# Patient Record
Sex: OTHER/UNKNOWN | Race: WHITE | NOT HISPANIC OR LATINO | Employment: FULL TIME | URBAN - METROPOLITAN AREA
[De-identification: names, ages, dates, MRNs, and addresses within clinical notes are randomized per-mention and may not be internally consistent; named-entity substitution may affect disease eponyms.]

---

## 2023-01-18 ENCOUNTER — TELEPHONE (OUTPATIENT)
Dept: BEHAVIORAL HEALTH | Facility: CLINIC | Age: 18
End: 2023-01-18

## 2023-01-18 ENCOUNTER — HOSPITAL ENCOUNTER (EMERGENCY)
Facility: CLINIC | Age: 18
Discharge: HOME | End: 2023-01-19
Attending: EMERGENCY MEDICINE | Admitting: EMERGENCY MEDICINE
Payer: COMMERCIAL

## 2023-01-18 DIAGNOSIS — F32.A DEPRESSION, UNSPECIFIED DEPRESSION TYPE: ICD-10-CM

## 2023-01-18 DIAGNOSIS — F41.9 ANXIETY: ICD-10-CM

## 2023-01-18 DIAGNOSIS — Z72.89 SELF-INJURIOUS BEHAVIOR: ICD-10-CM

## 2023-01-18 DIAGNOSIS — R45.851 SUICIDAL IDEATION: ICD-10-CM

## 2023-01-18 LAB — SARS-COV-2 RNA RESP QL NAA+PROBE: NEGATIVE

## 2023-01-18 PROCEDURE — 99285 EMERGENCY DEPT VISIT HI MDM: CPT | Mod: 25 | Performed by: EMERGENCY MEDICINE

## 2023-01-18 PROCEDURE — U0003 INFECTIOUS AGENT DETECTION BY NUCLEIC ACID (DNA OR RNA); SEVERE ACUTE RESPIRATORY SYNDROME CORONAVIRUS 2 (SARS-COV-2) (CORONAVIRUS DISEASE [COVID-19]), AMPLIFIED PROBE TECHNIQUE, MAKING USE OF HIGH THROUGHPUT TECHNOLOGIES AS DESCRIBED BY CMS-2020-01-R: HCPCS | Performed by: EMERGENCY MEDICINE

## 2023-01-18 PROCEDURE — 99285 EMERGENCY DEPT VISIT HI MDM: CPT | Performed by: EMERGENCY MEDICINE

## 2023-01-18 PROCEDURE — 250N000013 HC RX MED GY IP 250 OP 250 PS 637: Performed by: EMERGENCY MEDICINE

## 2023-01-18 PROCEDURE — 90791 PSYCH DIAGNOSTIC EVALUATION: CPT

## 2023-01-18 PROCEDURE — C9803 HOPD COVID-19 SPEC COLLECT: HCPCS | Performed by: EMERGENCY MEDICINE

## 2023-01-18 RX ORDER — TRAZODONE HYDROCHLORIDE 50 MG/1
50 TABLET, FILM COATED ORAL
Status: DISCONTINUED | OUTPATIENT
Start: 2023-01-18 | End: 2023-01-19 | Stop reason: HOSPADM

## 2023-01-18 RX ORDER — HYDROXYZINE HYDROCHLORIDE 25 MG/1
25 TABLET, FILM COATED ORAL EVERY 6 HOURS PRN
Status: DISCONTINUED | OUTPATIENT
Start: 2023-01-18 | End: 2023-01-19 | Stop reason: HOSPADM

## 2023-01-18 RX ADMIN — TRAZODONE HYDROCHLORIDE 50 MG: 50 TABLET ORAL at 23:40

## 2023-01-18 RX ADMIN — HYDROXYZINE HYDROCHLORIDE 25 MG: 25 TABLET, FILM COATED ORAL at 23:40

## 2023-01-18 ASSESSMENT — COLUMBIA-SUICIDE SEVERITY RATING SCALE - C-SSRS
ATTEMPT PAST THREE MONTHS: YES
1. HAVE YOU WISHED YOU WERE DEAD OR WISHED YOU COULD GO TO SLEEP AND NOT WAKE UP?: YES
5. HAVE YOU STARTED TO WORK OUT OR WORKED OUT THE DETAILS OF HOW TO KILL YOURSELF? DO YOU INTEND TO CARRY OUT THIS PLAN?: YES
TOTAL  NUMBER OF INTERRUPTED ATTEMPTS LIFETIME: NO
MOST RECENT DATE: 66492
LETHALITY/MEDICAL DAMAGE CODE FIRST POTENTIAL ATTEMPT: BEHAVIOR LIKELY TO RESULT IN DEATH DESPITE AVAILABLE MEDICAL CARE
4. HAVE YOU HAD THESE THOUGHTS AND HAD SOME INTENTION OF ACTING ON THEM?: YES
5. HAVE YOU STARTED TO WORK OUT OR WORKED OUT THE DETAILS OF HOW TO KILL YOURSELF? DO YOU INTEND TO CARRY OUT THIS PLAN?: YES
TOTAL  NUMBER OF ACTUAL ATTEMPTS PAST 3 MONTHS: 1
LETHALITY/MEDICAL DAMAGE CODE MOST RECENT ACTUAL ATTEMPT: NO PHYSICAL DAMAGE OR VERY MINOR PHYSICAL DAMAGE
4. HAVE YOU HAD THESE THOUGHTS AND HAD SOME INTENTION OF ACTING ON THEM?: YES
LETHALITY/MEDICAL DAMAGE CODE MOST LETHAL ACTUAL ATTEMPT: NO PHYSICAL DAMAGE OR VERY MINOR PHYSICAL DAMAGE
LETHALITY/MEDICAL DAMAGE CODE MOST RECENT POTENTIAL ATTEMPT: BEHAVIOR LIKELY TO RESULT IN INJURY BUT NOT LIKELY TO CAUSE DEATH
TOTAL  NUMBER OF ABORTED OR SELF INTERRUPTED ATTEMPTS PAST 3 MONTHS: YES
MOST LETHAL DATE: 66492
TOTAL  NUMBER OF ABORTED OR SELF INTERRUPTED ATTEMPTS LIFETIME: 1
LETHALITY/MEDICAL DAMAGE CODE MOST LETHAL POTENTIAL ATTEMPT: BEHAVIOR LIKELY TO RESULT IN INJURY BUT NOT LIKELY TO CAUSE DEATH
3. HAVE YOU BEEN THINKING ABOUT HOW YOU MIGHT KILL YOURSELF?: YES
REASONS FOR IDEATION LIFETIME: COMPLETELY TO END OR STOP THE PAIN (YOU COULDN'T GO ON LIVING WITH THE PAIN OR HOW YOU WERE FEELING)
TOTAL  NUMBER OF ABORTED OR SELF INTERRUPTED ATTEMPTS LIFETIME: YES
TOTAL  NUMBER OF ACTUAL ATTEMPTS LIFETIME: 2
LETHALITY/MEDICAL DAMAGE CODE FIRST ACTUAL ATTEMPT: MINOR PHYSICAL DAMAGE
REASONS FOR IDEATION PAST MONTH: COMPLETELY TO END OR STOP THE PAIN (YOU COULDN'T GO ON LIVING WITH THE PAIN OR HOW YOU WERE FEELING)
2. HAVE YOU ACTUALLY HAD ANY THOUGHTS OF KILLING YOURSELF?: YES
ATTEMPT LIFETIME: YES
2. HAVE YOU ACTUALLY HAD ANY THOUGHTS OF KILLING YOURSELF?: YES
TOTAL  NUMBER OF ABORTED OR SELF INTERRUPTED ATTEMPTS SINCE LAST CONTACT: 1
6. HAVE YOU EVER DONE ANYTHING, STARTED TO DO ANYTHING, OR PREPARED TO DO ANYTHING TO END YOUR LIFE?: NO
1. IN THE PAST MONTH, HAVE YOU WISHED YOU WERE DEAD OR WISHED YOU COULD GO TO SLEEP AND NOT WAKE UP?: YES

## 2023-01-18 ASSESSMENT — ACTIVITIES OF DAILY LIVING (ADL): ADLS_ACUITY_SCORE: 35

## 2023-01-19 ENCOUNTER — TELEPHONE (OUTPATIENT)
Dept: BEHAVIORAL HEALTH | Facility: CLINIC | Age: 18
End: 2023-01-19
Payer: COMMERCIAL

## 2023-01-19 VITALS
DIASTOLIC BLOOD PRESSURE: 63 MMHG | HEART RATE: 57 BPM | WEIGHT: 126.8 LBS | TEMPERATURE: 98.1 F | SYSTOLIC BLOOD PRESSURE: 98 MMHG | OXYGEN SATURATION: 99 % | RESPIRATION RATE: 16 BRPM

## 2023-01-19 LAB
ALBUMIN SERPL-MCNC: 3.5 G/DL (ref 3.4–5)
ALP SERPL-CCNC: 63 U/L (ref 40–260)
ALT SERPL W P-5'-P-CCNC: 15 U/L (ref 0–50)
ANION GAP SERPL CALCULATED.3IONS-SCNC: 5 MMOL/L (ref 3–14)
APAP SERPL-MCNC: <2 MG/L (ref 10–30)
AST SERPL W P-5'-P-CCNC: 11 U/L (ref 0–35)
BASOPHILS # BLD AUTO: 0.1 10E3/UL (ref 0–0.2)
BASOPHILS NFR BLD AUTO: 1 %
BILIRUB SERPL-MCNC: 0.3 MG/DL (ref 0.2–1.3)
BUN SERPL-MCNC: 6 MG/DL (ref 7–30)
CALCIUM SERPL-MCNC: 8.5 MG/DL (ref 8.5–10.1)
CHLORIDE BLD-SCNC: 113 MMOL/L (ref 96–110)
CO2 SERPL-SCNC: 24 MMOL/L (ref 20–32)
CREAT SERPL-MCNC: 0.56 MG/DL (ref 0.5–1)
EOSINOPHIL # BLD AUTO: 0.2 10E3/UL (ref 0–0.7)
EOSINOPHIL NFR BLD AUTO: 3 %
ERYTHROCYTE [DISTWIDTH] IN BLOOD BY AUTOMATED COUNT: 12.8 % (ref 10–15)
GFR SERPL CREATININE-BSD FRML MDRD: ABNORMAL ML/MIN/{1.73_M2}
GLUCOSE BLD-MCNC: 90 MG/DL (ref 70–99)
HCT VFR BLD AUTO: 36.8 % (ref 35–53)
HGB BLD-MCNC: 12.6 G/DL (ref 11.7–17.7)
IMM GRANULOCYTES # BLD: 0 10E3/UL
IMM GRANULOCYTES NFR BLD: 0 %
LYMPHOCYTES # BLD AUTO: 2.7 10E3/UL (ref 0.8–5.3)
LYMPHOCYTES NFR BLD AUTO: 41 %
MCH RBC QN AUTO: 30.2 PG (ref 26.5–33)
MCHC RBC AUTO-ENTMCNC: 34.2 G/DL (ref 31.5–36.5)
MCV RBC AUTO: 88 FL (ref 78–100)
MONOCYTES # BLD AUTO: 0.5 10E3/UL (ref 0–1.3)
MONOCYTES NFR BLD AUTO: 8 %
NEUTROPHILS # BLD AUTO: 3.1 10E3/UL (ref 1.6–8.3)
NEUTROPHILS NFR BLD AUTO: 47 %
NRBC # BLD AUTO: 0 10E3/UL
NRBC BLD AUTO-RTO: 0 /100
PLATELET # BLD AUTO: 311 10E3/UL (ref 150–450)
POTASSIUM BLD-SCNC: 3.5 MMOL/L (ref 3.4–5.3)
PROT SERPL-MCNC: 6.6 G/DL (ref 6.8–8.8)
RBC # BLD AUTO: 4.17 10E6/UL (ref 3.8–5.9)
SALICYLATES SERPL-MCNC: <2 MG/DL
SODIUM SERPL-SCNC: 142 MMOL/L (ref 133–144)
WBC # BLD AUTO: 6.5 10E3/UL (ref 4–11)

## 2023-01-19 PROCEDURE — 80053 COMPREHEN METABOLIC PANEL: CPT | Performed by: EMERGENCY MEDICINE

## 2023-01-19 PROCEDURE — 99284 EMERGENCY DEPT VISIT MOD MDM: CPT | Mod: 25

## 2023-01-19 PROCEDURE — 80179 DRUG ASSAY SALICYLATE: CPT | Performed by: EMERGENCY MEDICINE

## 2023-01-19 PROCEDURE — 250N000013 HC RX MED GY IP 250 OP 250 PS 637

## 2023-01-19 PROCEDURE — 36415 COLL VENOUS BLD VENIPUNCTURE: CPT | Performed by: EMERGENCY MEDICINE

## 2023-01-19 PROCEDURE — 85025 COMPLETE CBC W/AUTO DIFF WBC: CPT | Performed by: EMERGENCY MEDICINE

## 2023-01-19 PROCEDURE — 250N000013 HC RX MED GY IP 250 OP 250 PS 637: Performed by: EMERGENCY MEDICINE

## 2023-01-19 PROCEDURE — 80143 DRUG ASSAY ACETAMINOPHEN: CPT | Performed by: EMERGENCY MEDICINE

## 2023-01-19 RX ORDER — OLANZAPINE 5 MG/1
5 TABLET, ORALLY DISINTEGRATING ORAL AT BEDTIME
Status: DISCONTINUED | OUTPATIENT
Start: 2023-01-19 | End: 2023-01-19 | Stop reason: HOSPADM

## 2023-01-19 RX ORDER — FLUOXETINE 10 MG/1
10 CAPSULE ORAL DAILY
Status: DISCONTINUED | OUTPATIENT
Start: 2023-01-19 | End: 2023-01-19 | Stop reason: HOSPADM

## 2023-01-19 RX ADMIN — HYDROXYZINE HYDROCHLORIDE 25 MG: 25 TABLET, FILM COATED ORAL at 15:00

## 2023-01-19 RX ADMIN — FLUOXETINE 10 MG: 10 CAPSULE ORAL at 16:19

## 2023-01-19 ASSESSMENT — ACTIVITIES OF DAILY LIVING (ADL)
ADLS_ACUITY_SCORE: 35

## 2023-01-19 ASSESSMENT — COLUMBIA-SUICIDE SEVERITY RATING SCALE - C-SSRS
TOTAL  NUMBER OF ABORTED OR SELF INTERRUPTED ATTEMPTS SINCE LAST CONTACT: NO
ATTEMPT SINCE LAST CONTACT: NO
LETHALITY/MEDICAL DAMAGE CODE MOST LETHAL ACTUAL ATTEMPT: NO PHYSICAL DAMAGE OR VERY MINOR PHYSICAL DAMAGE
SUICIDE, SINCE LAST CONTACT: NO
1. SINCE LAST CONTACT, HAVE YOU WISHED YOU WERE DEAD OR WISHED YOU COULD GO TO SLEEP AND NOT WAKE UP?: NO
TOTAL  NUMBER OF INTERRUPTED ATTEMPTS SINCE LAST CONTACT: NO
MOST LETHAL DATE: 66492
6. HAVE YOU EVER DONE ANYTHING, STARTED TO DO ANYTHING, OR PREPARED TO DO ANYTHING TO END YOUR LIFE?: NO
2. HAVE YOU ACTUALLY HAD ANY THOUGHTS OF KILLING YOURSELF?: NO
LETHALITY/MEDICAL DAMAGE CODE MOST LETHAL POTENTIAL ATTEMPT: BEHAVIOR LIKELY TO RESULT IN INJURY BUT NOT LIKELY TO CAUSE DEATH

## 2023-01-19 NOTE — ED PROVIDER NOTES
"ED Provider Note  [facility]      History     Chief Complaint   Patient presents with     Suicidal     Pt was planning on ODing on a bottle of tylenol today but \"I got scared and called my mom\".      Self Injury     cut L FA with a razor blade \"2 or 3 days ago\"       HPI  Kelli CAICEDO Brown \"Roman\" is a 17 year old adult (they/them pronouns)who presents with mother for mental health evaluation.  They say they have hx of depression, anxiety, BPD. The patient is having suicidal thoughts with plan and intent.  Earlier today they took 4 tylenol pills.  They were intending to take the whole bottle but stopped because they got \"freaked out\" and called their mom who brought them here.  They are now thinking about turning on the car while in the garage to kill themself.  They have one prior suicide attempt in 2019 where they used a weight while they were in the bathtub.  They feel that parents have tried to deal with their mental health without seeing providers and doing \"meditation.\"  No prior admissions.  They saw a therapist occasionally or school counselor in the past.  They cut on both arms and last cut on left forearm 3 days ago.  They feel like they can be safe while waiting in the ED but not if they go home. They say the holidays were hard this past year and they have been feeling suicidal since New Years.  They also had a recent best friend break up. They took risperidone and prozac in the past and quit about 1 year ago. They felt it didn't help them.  Denies cd concerns.       Past Medical History  History reviewed. No pertinent past medical history.  History reviewed. No pertinent surgical history.  No current outpatient medications on file.    No Known Allergies  Family History  History reviewed. No pertinent family history.  Social History   Social History     Tobacco Use     Smoking status: Every Day     Types: Vaping Device     Smokeless tobacco: Never   Substance Use Topics     " Alcohol use: Not Currently     Drug use: Not Currently      Past medical history, past surgical history, medications, allergies, family history, and social history were reviewed with the patient. No additional pertinent items.      A complete review of systems was performed with pertinent positives and negatives noted in the HPI, and all other systems negative.    Physical Exam   BP: 104/71  Pulse: 94  Temp: 98.9  F (37.2  C)  Resp: 16  Weight: 57.5 kg (126 lb 12.8 oz)  SpO2: 97 %  Physical Exam  Vitals and nursing note reviewed.   Constitutional:       Appearance: Roman Mcnally is normal weight.   HENT:      Head: Normocephalic and atraumatic.      Nose: No congestion or rhinorrhea.   Eyes:      Extraocular Movements: Extraocular movements intact.   Cardiovascular:      Rate and Rhythm: Normal rate.   Pulmonary:      Effort: Pulmonary effort is normal.   Musculoskeletal:         General: Signs of injury present. No deformity.      Cervical back: Normal range of motion.   Skin:     Coloration: Skin is not jaundiced or pale.      Findings: No bruising, erythema, lesion or rash.   Neurological:      General: No focal deficit present.      Mental Status: Roman Mcnally is alert and oriented to person, place, and time.   Psychiatric:         Attention and Perception: Attention and perception normal.         Mood and Affect: Mood is anxious and depressed. Affect is flat.         Speech: Speech normal.         Behavior: Behavior is withdrawn.         Thought Content: Thought content includes suicidal ideation. Thought content includes suicidal plan.         Cognition and Memory: Cognition and memory normal.         Judgment: Judgment normal.           ED Course, Procedures, & Data      Procedures         Mental Health Risk Assessment      PSS-3    Date and Time Over the past 2 weeks have you felt down, depressed, or hopeless? Over the past 2 weeks have you had thoughts of killing yourself? Have you ever attempted to kill  "yourself? When did this last happen? User   01/18/23 2147 yes yes no -- [facility]      C-SSRS (Lucinda)    Date and Time Q1 Wished to be Dead (Past Month) Q2 Suicidal Thoughts (Past Month) Q3 Suicidal Thought Method Q4 Suicidal Intent without Specific Plan Q5 Suicide Intent with Specific Plan Q6 Suicide Behavior (Lifetime) Within the Past 3 Months? RETIRED: Level of Risk per Screen Screening Not Complete User   01/18/23 2147 yes yes yes yes yes no -- -- -- [facility]              Suicide assessment completed by mental health (D.E.C., LCSW, etc.)       No results found for any visits on 01/18/23.  Medications - No data to display  Labs Ordered and Resulted from Time of ED Arrival to Time of ED Departure - No data to display  No orders to display          Medical Decision Making  The patient presented with a problem that is a chronic illness mild to moderate exacerbation, progression, or side effect of treatment.    The patient's evaluation involved:  ordering and review of 3+ test(s) (see separate area of note for details)  discussion of management or test interpretation with another health professional (see separate area of note for details)    The patient's management involved a decision regarding hospitalization.      Assessment & Plan    Kelli Mcnally \"Roman\" is a 17 year old adult (they/them pronouns)who presents with mother for mental health evaluation.  They say they have hx of depression, anxiety, BPD. The patient is having suicidal thoughts with plan and intent.  They were planning to take an entire bottle of tylenol earlier today but took 4 and then stopped because they \"freaked out.\"  They are now thinking about killing them with CO in car/garage.  They were seen by myself and the DEC  and we discussed the case. Admission to inpatient mental health is recommended.  Patient agrees with this plan . This is a voluntary admit.  Labs were ordered based on hx of ingestion earlier today. Dr. Frias will check labs to " make sure no further intervention is needed.      I have reviewed the nursing notes. I have reviewed the findings, diagnosis, plan and need for follow up with the patient.    New Prescriptions    No medications on file       Final diagnoses:   Depression, unspecified depression type   Anxiety   Suicidal ideation   Self-injurious behavior       Catie Spangler MD  [facility] EMERGENCY DEPARTMENT  1/18/2023     Catie Spangler MD  01/23/23 1840     Alert and oriented to person, place and time

## 2023-01-19 NOTE — DISCHARGE INSTRUCTIONS
Aftercare Plan    Follow up with established providers and supports as scheduled. Continue taking medications as prescribed. Abstain from drugs and alcohol. Utilize your [facility] crisis team as needed. They are available 24/7. Contact information is listed below.     The following appointment(s) and/or referral(s) were made on your behalf. If you need to make changes or cancel please contact the clinic/provider directly.     Individual Therapy    Date: Wednesday, 1/25/2023  Time: 11:00 am - 12:00 pm  Provider: Shellie Mullen  Location: [facility], [address] [place] [address]  Phone: [phone]  Type: Therapy - Initial (In-Person)    Patient Instructions  Intakes must be confirmed with client either by email or phone call prior to the appointment. Clients will be sent an email to log into the patient portal to complete intake forms. All intake forms must be completed before the scheduled intake. If the session is scheduled for telehealth, client's will receive an email with a zoom link. Shellie can be reached at [phone] or [email or web address] Address is [address] [place] [address]. ([facility] on [facility])    Psychiatry    Date: Thursday, 2/9/2023  Time: 8:30 am - 10:00 am  Provider: Liam PLASCENCIA  CNP,RN  Location: [facility], [address] [place] [address], [facility] [address] [place] [address]  Phone: [phone]  Type: Medication Mgmt - Initial (In-Person)    A referral will also be made for you to [facility]'s Transitions [facility]. The Transitions Clinic can see you for medication management until your psychiatry appointment on 02/09/23. You will receive a call from the Transitions Clinic in the next couple of days to schedule a medication management appointment.     Remember: give the referrals 3 sessions prior to calling it quits. Do you trust them? Do you feel understood? Do you think they can help? Check in with yourself after each  session    If I am feeling unsafe or I am in a crisis, I will:   Contact my established care providers   Call the National Suicide Prevention Lifeline: 315.832.9418   Go to the nearest emergency room   Call 911     Warning signs that I or other people might notice when a crisis is developing for me: changes to sleep, appetite or mood, increased anger, agitation or irritability, feeling depressed or hopeless, spending more time alone or talking less, increased crying, decreased productivity, seeing or hearing things that aren't there, thoughts of not wanting to live anymore or of actually killing myself, thoughts of hurting others    Things I am able to do on my own to cope or help me feel better: watching a favorite tv show or movie, listening to music I enjoy, going outside and breathing fresh air, going for a walk or exercising, taking a shower or bath, a cold or hot beverage, a healthy snack, drawing/coloring/painting, journaling, singing or dancing, deep breathing     I can try practicing square breathing when I begin to feel anxious - inhale through the nose for the count of 4 and the first line on the square. Exhale through the mouth for the count of 4 for the second line of the square. Repeat to complete the square. Repeat the square as many times as needed.    I can also use my five senses to practice mindfulness and grounding. What are five things I can see, four things I can hear, three things I can feel, two things I can smell, and one thing I can taste.     Things that I am able to do with others to cope or help me feel better: sometimes just talking or spending time with someone else, sharing a meal or having coffee, watching a movie or playing a game, going for a walk or exercising    I can also use community resources including mental health hotlines, [facility] crisis teams, or apps.     Things I can use or do for distraction: movies/tv, music, reading, games, drawing/coloring/painting or other art,  "essential oils, exercise, cleaning/organizing, puzzles, crossword puzzles, word search, Sudoku       I can also download a meditation or relaxation riana, like Calm, Headspace, or Insight Timer (all three offer a free version)    Changes I can make to support my mental health and wellness: Attend scheduled mental health therapy and psychiatric appointments. Take my medications as prescribed. Maintain a daily schedule/routine. Abstain from all mood altering substances, including drugs, alcohol, or medications not currently prescribed to me. Implement a self-care routine.      People in my life that I can ask for help: friends or family, trusted teachers/staff/colleagues, trusted members of my community or place of [place], mental health crisis lines, or 911    Your [facility] has a mental health crisis team you can call 24/7: [facility] Adult, [phone]    Other things that are important when I m in crisis: to remember that the feelings I am having right now are temporary, and it won't feel like this forever, and that it is okay and important to ask for help    Crisis Lines  Crisis Text Line  Text 512359  You will be connected with a trained live crisis counselor to provide support.    Por espanol, texto  KIANA a 635320 o texto a 442-AYUDAME en WhatsApp    National Hope Line  1.800.SUICIDE [9550113]      Community Resources  Fast Tracker  Linking people to mental health and substance use disorder resources  fasttrackermn.org     [place] Mental Health Warm Line  Peer to peer support  Monday thru Saturday, 12 pm to 10 pm  363.346.1538 or 1.509.168.7253  Text \"Support\" to 12177    National [place] on Mental Illness ([place])  398.121.6376 or 1.888.EL.HELPS      Mental Health Apps  My3  https://my3app.org/    VirtualHopeBox  https://[employer].org/apps/virtual-hope-box/      Additional Information  Today you were seen by a licensed mental health professional through Triage and Transition services, " Behavioral Healthcare Providers ([facility])  for a crisis assessment in the Emergency Department at [facility].  It is recommended that you follow up with your established providers (psychiatrist, mental health therapist, and/or primary care doctor - as relevant) as soon as possible. Coordinators from [facility] will be calling you in the next 24-48 hours to ensure that you have the resources you need.  You can also contact [facility] coordinators directly at [phone]. You may have been scheduled for or offered an appointment with a mental health provider. [facility] maintains an extensive network of licensed behavioral health providers to connect patients with the services they need.  We do not charge providers a fee to participate in our referral network.  We match patients with providers based on a patient's specific needs, insurance coverage, and location.  Our first effort will be to refer you to a provider within your care system, and will utilize providers outside your care system as needed.

## 2023-01-19 NOTE — ED NOTES
Patient agreeable to discharge plan. Discharge instructions reviewed with patient including follow-up care plan. Medications: were unchanged during this visit. Reviewed safety plan and outpatient resources. Denies SI and HI. All belongings that were brought into the hospital have been returned to patient. At time of departure pt had no question and no unmet needs. Escorted off the unit at 1715 accompanied by [facility] staff. Discharged to home via private car.

## 2023-01-19 NOTE — CONSULTS
"  Kelli Mcnally MRN# [id number]   Age: 17 year old YOB: 2005   Date of Admission to ED: 1/18/2023    In person visit Details:     Patient was assessed and interviewed face-to-face in person with this writer sandro. Patient was observed to be able to participate in the assessment as evidenced by verbal consent. Assessment methods included conducting a formal interview with patient, review of medical records, collaboration with medical staff, and obtaining relevant collateral information from family and community providers when available.        Reason for Consult:   Consult was requested by ED provider for medication management    Writer met patient in their room face-to-face by themself, pleasant and cooperative during assessment and interview.  Currently patient denied any suicidal ideation or homicidal ideation or self injury behavior.  Patient said they do not want any more to be hospitalized at they would like to be discharged home.  Patient said \" I used to be on Prozac, risperidone previously has not been taking for the last 9 months, used to be have psychiatrist and therapist, I was diagnosed for borderline personality disorder.\"  Patient said \" currently I feel sad but I am not suicidal, I have a family who will take care of me at home, my mom will be with me.\"  Patient currently lives with their parent who are , patient said both parents are very supportive, denied any psychological or physical abuse from parent, patient denied any trauma.  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    Patient endorses depressive symptoms, including sleep alteration, loss of interest in pleasurable activities, feelings of guilt/worthlessness/hopelessness, problems with energy, problems with concentration, appetite disturbance. " Patient denies suicidal and homicidal ideation.Patient endorses symptoms of generalized anxiety, including excessive worrying throughout the day, associated with, restlessness, easy fatigability, concentration difficulty, irritability, muscle tension and disrupted sleep.    Patient reported dropping out of school in [employer] high.  Patient endorsed an anxiety and depression, agreed to start taking medication to help her with persistence depression and anxiety.  Prozac, olanzapine will start and patient agreed to follow-up with her outpatient psychiatry.Patient advised against using alcohol and/or illicit substances while on psychotropic medication due to risk of negative interactions, and is aware doing so will be at thier own risk.  -Fluoxetine (Prozac): the patient was informed of possible adverse effects including, but not limited to: weight gain, headaches, dizziness, nausea/vomiting, abdominal pain, diarrhea/constipation, tremors, muscle pain/aches. This includes black box warning of emergence of suicidal ideations.  Olanzapine (Zyprexa): the patient was informed of possible adverse effects including, but not limited to: akathisia, extrapyramidal symptoms, dystonia, drowsiness, and long term concerns for tardive dyskinesia, weight gain, insulin resistance, dyslipidemia, and cognitive slowing. The patient expressed understanding. Alternatives to this medication were also discussed with the patient, including risks of not taking medications, and the patient was agreeable to the above choice.      I have reviewed the nursing notes. I have reviewed the findings, diagnosis, plan and need for follow up with the patient.         HPI:     Suicidal ideation due to feeling of hopelessness and helplessness history of self injury behavior          Pt has not required locked seclusion or restraints in the past 24 hours to maintain safety, please refer to RN documentation for further details.  Substance use does not appear to  be playing a contributing role in the patient's presentation patient says she use marijuana daily.  Brief Therapeutic Intervention(s):   Provided active listening, unconditional positive regard, and validation. Engaged in cognitive restructuring/ reframing, looked at common cognitive distortions and challenged negative thoughts. Engaged in guided discovery, explored patient's perspectives and helped expand them through socratic dialogue. Provided positive reinforcement for progress towards goals, gains in knowledge, and application of skills previously taught.  Engaged in social skills training. Explored and identified early warning signs to anger        Past Psychiatric History:     Patient never been hospitalized at previously in mental health unit  Depression unspecified, and generalized anxiety disorder, self injury behavior and suicidal ideation        Substance Use and History:     Daily marijuana use      Past Medical History:   PAST MEDICAL HISTORY: History reviewed. No pertinent past medical history.    PAST SURGICAL HISTORY: History reviewed. No pertinent surgical history.            Allergies:   No Known Allergies          Medications:     I have reviewed this patient's current medications  Current Facility-Administered Medications   Medication     FLUoxetine (PROzac) capsule 10 mg     hydrOXYzine (ATARAX) tablet 25 mg     melatonin tablet 5 mg     OLANZapine zydis (zyPREXA) ODT tab 5 mg     traZODone (DESYREL) half-tab 25 mg    Or     traZODone (DESYREL) tablet 50 mg     No current outpatient medications on file.              Family History:   FAMILY HISTORY: History reviewed. No pertinent family history.        Social History:   SOCIAL HISTORY:   Social History     Tobacco Use     Smoking status: Every Day     Types: Vaping Device     Smokeless tobacco: Never   Substance Use Topics     Alcohol use: Not Currently            PTA Medications:   (Not in a hospital admission)         Allergies:   No Known  Allergies       Labs:     Recent Results (from the past 48 hour(s))   Asymptomatic COVID-19 Virus (Coronavirus) by PCR Nasopharyngeal    Collection Time: 01/18/23  9:51 PM    Specimen: Nasopharyngeal; Swab   Result Value Ref Range    SARS CoV2 PCR Negative Negative   Comprehensive metabolic panel    Collection Time: 01/19/23 12:51 AM   Result Value Ref Range    Sodium 142 133 - 144 mmol/L    Potassium 3.5 3.4 - 5.3 mmol/L    Chloride 113 (H) 96 - 110 mmol/L    Carbon Dioxide (CO2) 24 20 - 32 mmol/L    Anion Gap 5 3 - 14 mmol/L    Urea Nitrogen 6 (L) 7 - 30 mg/dL    Creatinine 0.56 0.50 - 1.00 mg/dL    Calcium 8.5 8.5 - 10.1 mg/dL    Glucose 90 70 - 99 mg/dL    Alkaline Phosphatase 63 40 - 260 U/L    AST 11 0 - 35 U/L    ALT 15 0 - 50 U/L    Protein Total 6.6 (L) 6.8 - 8.8 g/dL    Albumin 3.5 3.4 - 5.0 g/dL    Bilirubin Total 0.3 0.2 - 1.3 mg/dL    GFR Estimate     Acetaminophen level    Collection Time: 01/19/23 12:51 AM   Result Value Ref Range    Acetaminophen <2 (L) 10 - 30 mg/L   Salicylate level    Collection Time: 01/19/23 12:51 AM   Result Value Ref Range    Salicylate <2 <20 mg/dL   CBC with platelets and differential    Collection Time: 01/19/23 12:51 AM   Result Value Ref Range    WBC Count 6.5 4.0 - 11.0 10e3/uL    RBC Count 4.17 3.80 - 5.90 10e6/uL    Hemoglobin 12.6 11.7 - 17.7 g/dL    Hematocrit 36.8 35.0 - 53.0 %    MCV 88 78 - 100 fL    MCH 30.2 26.5 - 33.0 pg    MCHC 34.2 31.5 - 36.5 g/dL    RDW 12.8 10.0 - 15.0 %    Platelet Count 311 150 - 450 10e3/uL    % Neutrophils 47 %    % Lymphocytes 41 %    % Monocytes 8 %    % Eosinophils 3 %    % Basophils 1 %    % Immature Granulocytes 0 %    NRBCs per 100 WBC 0 <1 /100    Absolute Neutrophils 3.1 1.6 - 8.3 10e3/uL    Absolute Lymphocytes 2.7 0.8 - 5.3 10e3/uL    Absolute Monocytes 0.5 0.0 - 1.3 10e3/uL    Absolute Eosinophils 0.2 0.0 - 0.7 10e3/uL    Absolute Basophils 0.1 0.0 - 0.2 10e3/uL    Absolute Immature Granulocytes 0.0 <=0.4 10e3/uL    Absolute  NRBCs 0.0 10e3/uL          Physical and Psychiatric Examination:     /64 (BP Location: Left arm, Patient Position: Sitting)   Pulse 84   Temp 97.9  F (36.6  C) (Oral)   Resp 16   Wt 57.5 kg (126 lb 12.8 oz)   SpO2 96%   Weight is 126 lbs 12.8 oz  There is no height or weight on file to calculate BMI.    Mental Status Exam:  Appearance: awake, alert  Attitude:  cooperative  Eye Contact:  good  Mood:  sad   Affect:  appropriate and in normal range  Speech:  clear, coherent  Language: fluent and intact in English  Psychomotor, Gait, Musculoskeletal:  no evidence of tardive dyskinesia, dystonia, or tics  Thought Process:  logical and linear  Associations:  no loose associations  Thought Content:  no evidence of suicidal ideation or homicidal ideation  Insight:  good  Judgement:  intact  Oriented to:  time, person, and place  Attention Span and Concentration:  intact  Recent and Remote Memory:  fair  Fund of Knowledge:  appropriate         Diagnoses:      Depression, unspecified depression type  Anxiety  Suicidal ideation  Self-injurious behavior         Recommendations:   1.  DC per [facility] teams and the ED provider  2.  Patient need outpatient psychiatry to follow-up for medication they started in the emergency room, fluoxetine 10 mg and olanzapine 5 mg at bedtime  3.  Outpatient therapy  4.  Consult psychiatry as needed      -  treatment per ED team    - Discussion with  .  - Consulted with [facility], ED physician, patient's regarding this case.    Please call [facility] at [phone] if you have follow-up questions or wish to place another consult.  Aliyah Mcdonald Psychiatric Nurse practitioner    Attestation:  Time with:  Patient: 30  minutes  Treatment Team: 20 Minutes  Chart Review: 30 minutes    Total time spent was 80 minutes. Over 50% of times was spent counseling and coordination of care.    Aliyah CAICEDO, CNP, APRN, Psychiatric Nurse Practitioner have personally performed an examination of this patient.  I  have edited the note to reflect all relevant changes.  I have discussed this patient with the care team January 19, 2023.  I have reviewed all vitals and laboratory findings.    Disclaimer: This note consists of symbols derived from keyboarding,

## 2023-01-19 NOTE — TELEPHONE ENCOUNTER
0330 Bed Search Update:    [facility]-Website updated 1/18 at 2342 to reflect there are no beds available.  [facility] [place] [place], [facility] [address] [facility]  [facility] unit reporting they are at capacity.  Check back in AM.  [facility]-Website updated 1/19 at 0002 to reflect there are no beds available.    [facility]-Committed pts only.  Long wait list    Remains on wait list.

## 2023-01-19 NOTE — CONSULTS
"Diagnostic Evaluation Consultation  Crisis Assessment    Patient was assessed: In Person  Patient location: [facility] ED   Was a release of information signed: No. Reason: no providers, no referrals      Referral Data and Chief Complaint  Roman is a 17 year old, who uses they/them pronouns, and presents to the ED with family/friends. Patient is referred to the ED by self. Patient is presenting to the ED for the following concerns: suicidal ideation/suicide attempt aborted.      Informed Consent and Assessment Methods     Patient is their own guardian. Writer met with patient and explained the crisis assessment process, including applicable information disclosures and limits to confidentiality, assessed understanding of the process, and obtained consent to proceed with the assessment. Patient was observed to be able to participate in the assessment as evidenced by responding to assessment questions. Assessment methods included conducting a formal interview with patient, review of medical records, collaboration with medical staff, and obtaining relevant collateral information from family and community providers when available..     Over the course of this crisis assessment provided reassurance, offered validation, engaged patient in problem solving and disposition planning and provided psychoeducation. Patient's response to interventions was positive.      Summary of Patient Situation     Pt presents to ED for concerns of suicidal ideation. Pt took 4 tylenol earlier tonight and planned to take the entire bottle as a suicide attempt. Pt became scared and called their mother, reaching out for help. Currently, pt expresses suicidal ideation with a plan to overdose or die from carbon monoxide poisoning and intent to follow through. Pt endorses visual hallucinations, stating they \"see small things out of the corner of their eye\". Pt engaged in NSSI three days ago via cutting. Pt has history of cutting. Pt does not have providers " "at this time. History of taking risperidone and prozac, stopped one year ago because they felt better. Pt has previous suicide attempt in 2019 via trying to drown themselves with a weight in their bathtub. Pt reports a fractured relationship with parents because they don't always believe in mental health and are emotionally unavailable. No history of hospitalizations or programmatic care. Pt appears very flat and depressed. Pt believes this crisis started on New Years Keesha after they experienced a \"best friend breakup\".     Brief Psychosocial History     Pt lives with parents at this time. Pt dropped out of [employer] in 2020 due to mental health problems. Pt has a negative relationship with parents due to mental health struggles and parents being emotionally unavailable. Pt does not have friends or supports, they report. No legal issues. Pt is non-binary and uses they/them pronouns.     Significant Clinical History     Pt reports history of borderline personality disorder, anxiety, and depression. No history of such diagnosis in Epic. Denies substance abuse. Pt reports their symptoms of depression and anxiety are overwhelming and non-manageable. No treatment team at this time. Denies previous programmatic care and hospitalizations. Denies trauma history.      Collateral Information  Pt preferred that  did not speak with mother as their relationship is fractured at this time.        Risk Assessment  [place] Suicide Severity Rating Scale Full Clinical Version: High risk   Suicidal Ideation  1. Wish to be Dead (Lifetime): Yes  1. Wish to be Dead (Past 1 Month): Yes  2. Non-Specific Active Suicidal Thoughts (Lifetime): Yes  2. Non-Specific Active Suicidal Thoughts (Past 1 Month): Yes  3. Active Suicidal Ideation with any Methods (Not Plan) Without Intent to Act (Lifetime): Yes  3. Active Suicidal Ideation with any Methods (Not Plan) Without Intent to Act (Past 1 Month): Yes  4. Active Suicidal Ideation with " Some Intent to Act, Without Specific Plan (Lifetime): Yes  4. Active Suicidal Ideation with Some Intent to Act, Without Specific Plan (Past 1 Month): Yes  5. Active Suicidal Ideation with Specific Plan and Intent (Lifetime): Yes  5. Active Suicidal Ideation with Specific Plan and Intent (Past 1 Month): Yes  Intensity of Ideation  Most Severe Ideation Rating (Lifetime): 5  Most Severe Ideation Rating (Past 1 Month): 5  Frequency (Lifetime): Once a week  Frequency (Past 1 Month): Many times each day  Duration (Lifetime): 1-4 hours/a lot of time  Duration (Past 1 Month): 4-8 hours/most of day  Controllability (Lifetime): Can control thoughts with some difficulty  Controllability (Past 1 Month): Unable to control thoughts  Deterrents (Lifetime): Uncertain that deterrents stopped you  Deterrents (Past 1 Month): Uncertain that deterrents stopped you  Reasons for Ideation (Lifetime): Completely to end or stop the pain (You couldn't go on living with the pain or how you were feeling)  Reasons for Ideation (Past 1 Month): Completely to end or stop the pain (You couldn't go on living with the pain or how you were feeling)  Suicidal Behavior  Actual Attempt (Lifetime): Yes  Total Number of Actual Attempts (Lifetime): 2  Actual Attempt Description (Lifetime): drowning, medication overdose  Actual Attempt (Past 3 Months): Yes  Total Number of Actual Attempts (Past 3 Months): 1  Actual Attempt Description (Past 3 Months): tylenol overdose attempt  Has subject engaged in non-suicidal self-injurious behavior? (Lifetime): Yes  Has subject engaged in non-suicidal self-injurious behavior? (Past 3 Months): Yes  Interrupted Attempts (Lifetime): No  Aborted or Self-Interrupted Attempt (Lifetime): Yes  Total Number of Aborted or Self-Interrupted Attempts (Lifetime): 1  Aborted or Self-Interrupted Attempt Description (Lifetime): took 4 tylenol and stopped  Aborted or Self-Interrupted Attempt (Past 3 Months): Yes  Total Number of Aborted or  Self-Interrupted Attempts (Past 3 Months): 1  Aborted or Self-Interrupted Attempt Description (Past 3 Months): took 4 tylenol and stopped  Preparatory Acts or Behavior (Lifetime): No  C-SSRS Risk (Lifetime/Recent)  Calculated C-SSRS Risk Score (Lifetime/Recent): High Risk    [place] Suicide Severity Rating Scale Since Last Contact:        Actual/Potential Lethality (Most Lethal Attempt)  Most Lethal Attempt Date: 01/18/23  Actual Lethality/Medical Damage Code (Most Lethal Attempt): No physical damage or very minor physical damage  Potential Lethality Code (Most Lethal Attempt): Behavior likely to result in injury but not likely to cause death       Validity of evaluation is not impacted by presenting factors during interview.   Comments regarding subjective versus objective responses to [place] tool: n/a  Environmental or Psychosocial Events: challenging interpersonal relationships, helplessness/hopelessness, other life stressors and social isolation  Chronic Risk Factors: history of suicide attempts (x2), LGBTQ+ orientation  and history of Non-Suicidal Self Injury (NSSI)   Warning Signs: seeking access to means to hurt or kill self, talking or writing about death, dying, or suicide, hopelessness, feeling trapped, like there is no way out, withdrawing from friends, family, and society, anxiety, agitation, unable to sleep, sleeping all the time and recent losses (physical, financial, personal)  Protective Factors: responsibilities and duties to others, including pets and children, lives in a responsibly safe and stable environment and help seeking  Interpretation of Risk Scoring, Risk Mitigation Interventions and Safety Plan:  High risk is valid. Pt is actively suicidal with plan to overdose on medication or die via carbon monoxide poisoning. Pt has intent to follow through. Pt had aborted attempt earlier tonight via ingesting 4 tylenol but stopping because they were scared. Pt has minimal interventions to mitigate  risk. Unable to plan for safety.        Does the patient have thoughts of harming others? No     Is the patient engaging in sexually inappropriate behavior?  no        Current Substance Abuse     Is there recent substance abuse? no     Was a urine drug screen or blood alcohol level obtained: Yes ordered, not collected        Mental Status Exam     Affect: Flat   Appearance: Appropriate    Attention Span/Concentration: Attentive  Eye Contact: Engaged   Fund of Knowledge: Appropriate    Language /Speech Content: Fluent   Language /Speech Volume: Normal    Language /Speech Rate/Productions: Articulate    Recent Memory: Intact   Remote Memory: Intact   Mood: Anxious, Depressed and Sad    Orientation to Person: Yes    Orientation to Place: Yes   Orientation to Time of Day: Yes    Orientation to Date: Yes    Situation (Do they understand why they are here?): Yes    Psychomotor Behavior: Underactive    Thought Content: Suicidal   Thought Form: Intact      History of commitment: No       Medication    Psychotropic medications: No current medications but a history of risperidal and prozac.  Medication changes made in the last two weeks: No       Current Care Team    Primary Care Provider: No  Psychiatrist: No  Therapist: No  : No     CTSS or ARMHS: No  [facility] Team: No  Other: No      Diagnosis  Unspecified depressive disorder F32.9 - Primary   Unspecified anxiety disorder F41.9     Clinical Summary and Substantiation of Recommendations    Pt is at risk for harm to self. Pt is actively suicidal with plan to overdose on medication or die by carbon monoxide poisoning. Pt has intent to follow through. Earlier in the night, pt planned to take a bottle of tylenol, but only took 4 and then aborted due to being scared. Pt does not have outpatient providers at this time and their mental health has rapidly decompensated since New Years Keesha. Pt will require in patient hospitalization to start medications and manage symptoms in  a safe environment. Pt in agreement. Pt is voluntary.    Admission to Inpatient Level of Care is indicated due to:    1. Patient risk of severity of behavioral health disorder is appropriate to proposed level of care as indicated by:    Imminent Risk of Harm: Very Recent suicide attempt or deliberate act of serious harm to self WITHOUT relief of factors precipitating the attempt or act and Current plan for suicide or serious harm to self is present  And/or:  Behavioral health disorder is present and appropriate for inpatient care with both of the following:     Severe psychiatric, behavioral or other comorbid conditions are appropriate for management at inpatient mental health as indicated by at least one of the following:   o Depressive symptoms and Anxiety and Other emotional behavioral or behavioral disturbance     Severe dysfunction in daily living is present as indicated by at least one of the following:   o Other evidence of severe dysfunction    2. Inpatient mental health services are necessary to meet patient needs and at least one of the following:  Specific condition related to admission diagnosis is present and judged likely to further improve at proposed level of care    3. Situation and expectations are appropriate for inpatient care, as indicated by one of the following:   Voluntary treatment at lower level of care is not feasible    Disposition    Recommended disposition: Inpatient Mental Health       Reviewed case and recommendations with attending provider. Attending Name: Dr. Spangler        Attending concurs with disposition: Yes       Patient concurs with disposition: Yes       Guardian concurs with disposition: NA      Final disposition: Inpatient mental health .     Inpatient Details (if applicable):   Is patient admitted voluntarily:Yes      Patient aware of potential for transfer if there is not appropriate placement? Yes       [facility]       Behavioral Intake Notified? Yes: Date: 1/18/2023 Time: 10:40 pm.       Assessment Details    Patient interview started at: 10 pm and completed at: 10 30 pm.     Total duration spent on the patient case in minutes: .50 hrs      CPT code(s) utilized: 56986 - Psychotherapy for Crisis - 60 (30-74*) min       FERNANDO Holland, Psychotherapist Trainee, Psychotherapist  [facility] - Triage & Transition Services  Callback: [phone]

## 2023-01-19 NOTE — ED PROVIDER NOTES
[facility] ED Mental Health Handoff Note:       Brief HPI:  This is a 17 year old adult signed out to me by the previous ED proivder.  See initial ED Provider note for full details of the presentation. Interval history is pertinent for [facility] and psychiatric consultant involvement. Patient feels much improved today and would like to go home. She maintains she can be safe. Psychiatric provider agrees and feels she is not holdable.    Home meds reviewed and ordered/administered: Yes    Medically stable for inpatient mental health admission: Yes.    Evaluated by mental health: Yes. The recommendation is for outpatient mental health treatment. Resources and plan given to patient.    Safety concerns: At the time I received sign out, there were no safety concerns.    Hold Status:  Active Orders   N/A         Exam:   Patient Vitals for the past 24 hrs:   BP Temp Temp src Pulse Resp SpO2 Weight   01/19/23 1344 -- 97.9  F (36.6  C) Oral -- -- -- --   01/19/23 1248 103/64 -- -- 84 16 96 % --   01/18/23 2153 -- -- -- -- -- -- 57.5 kg (126 lb 12.8 oz)   01/18/23 2146 104/71 98.9  F (37.2  C) Oral 94 16 97 % --       ED Course:    Medications   melatonin tablet 5 mg (has no administration in time range)   traZODone (DESYREL) half-tab 25 mg ( Oral See Alternative 1/18/23 2340)     Or   traZODone (DESYREL) tablet 50 mg (50 mg Oral Given 1/18/23 2340)   hydrOXYzine (ATARAX) tablet 25 mg (25 mg Oral Given 1/19/23 1500)   FLUoxetine (PROzac) capsule 10 mg (has no administration in time range)   OLANZapine zydis (zyPREXA) ODT tab 5 mg (has no administration in time range)            There were no significant events during my shift.        Impression:    ICD-10-CM    1. Depression, unspecified depression type  F32.A       2. Anxiety  F41.9       3. Suicidal ideation  R45.851       4. Self-injurious behavior  Z72.89           Plan:    1. Discharged.      RESULTS:   Results for orders placed or performed during the  hospital encounter of 01/18/23 (from the past 24 hour(s))   Asymptomatic COVID-19 Virus (Coronavirus) by PCR Nasopharyngeal     Status: Normal    Collection Time: 01/18/23  9:51 PM    Specimen: Nasopharyngeal; Swab   Result Value Ref Range    SARS CoV2 PCR Negative Negative    Narrative    Testing was performed using the Xpert Xpress SARS-CoV-2 Assay on the Cepheid Gene-Xpert Instrument Systems. Additional information about this Emergency Use Authorization (EUA) assay can be found via the Lab Guide. This test should be ordered for the detection of SARS-CoV-2 in individuals who meet SARS-CoV-2 clinical and/or epidemiological criteria as well as from individuals without symptoms or other reasons to suspect COVID-19. Test performance for asymptomatic patients has only been established in anterior nasal swab specimens. This test is for in vitro diagnostic use under the FDA EUA for laboratories certified under CLIA to perform high complexity testing. This test has not been FDA cleared or approved. A negative result does not rule out the presence of PCR inhibitors in the specimen or target RNA concentration below the limit of detection for the assay. The possibility of a false negative should be considered if the patient's recent exposure or clinical presentation suggests COVID-19. This test was validated by the [facility]. This laboratory is certified under the Clinical Laboratory Improvement Amendments (CLIA) as qualified to perform high complexity laboratory testing.     Urine Drugs of Abuse Screen     Status: None ()    Collection Time: 01/18/23 10:10 PM    Narrative    The following orders were created for panel order Urine Drugs of Abuse Screen.  Procedure                               Abnormality         Status                     ---------                               -----------         ------                     Drug abuse screen 1 urin...[[id number]]                           "                         Please view results for these tests on the individual orders.   Psychiatry IP Consult: medication recommendation; Consultant may enter orders: Yes; Requesting provider? Other supervising provider; Name: abi amador MD     Status: None ()    Collection Time: 01/18/23 10:24 PM    Randy    Aliyah Mcdonald BETHEL, APRN CNP     1/19/2023  2:58 PM    Kelli Mcnally MRN# [id number]   Age: 17 year old YOB: 2005   Date of Admission to ED: 1/18/2023    In person visit Details:     Patient was assessed and interviewed face-to-face in person with   this writer sandro. Patient was observed to be able to participate in   the assessment as evidenced by verbal consent. Assessment methods   included conducting a formal interview with patient, review of   medical records, collaboration with medical staff, and obtaining   relevant collateral information from family and community   providers when available.        Reason for Consult:   Consult was requested by ED provider for medication management    Writer met patient in their room face-to-face by themself,   pleasant and cooperative during assessment and interview.    Currently patient denied any suicidal ideation or homicidal   ideation or self injury behavior.  Patient said they do not want   any more to be hospitalized at they would like to be discharged   home.  Patient said \" I used to be on Prozac, risperidone previously has   not been taking for the last 9 months, used to be have   psychiatrist and therapist, I was diagnosed for borderline   personality disorder.\"  Patient said \" currently I feel sad but I   am not suicidal, I have a family who will take care of me at   home, my mom will be with me.\"  Patient currently lives with their parent who are ,   patient said both parents are very supportive, denied any   psychological or physical abuse from parent, patient denied any   trauma.  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    Patient endorses depressive symptoms, including sleep alteration,   loss of interest in pleasurable activities, feelings of   guilt/worthlessness/hopelessness, problems with energy, problems   with concentration, appetite disturbance. Patient denies suicidal   and homicidal ideation.Patient endorses symptoms of generalized   anxiety, including excessive worrying throughout the day,   associated with, restlessness, easy fatigability, concentration   difficulty, irritability, muscle tension and disrupted sleep.    Patient reported dropping out of school in [employer] high.  Patient   endorsed an anxiety and depression, agreed to start taking   medication to help her with persistence depression and anxiety.    Prozac, olanzapine will start and patient agreed to follow-up   with her outpatient psychiatry.Patient advised against using   alcohol and/or illicit substances while on psychotropic   medication due to risk of negative interactions, and is aware   doing so will be at thier own risk.  -Fluoxetine (Prozac): the patient was informed of possible   adverse effects including, but not limited to: weight gain,   headaches, dizziness, nausea/vomiting, abdominal pain,   diarrhea/constipation, tremors, muscle pain/aches. This includes   black box warning of emergence of suicidal ideations.  Olanzapine (Zyprexa): the patient was informed of possible   adverse effects including, but not limited to: akathisia,   extrapyramidal symptoms, dystonia, drowsiness, and long term   concerns for tardive dyskinesia, weight gain, insulin resistance,   dyslipidemia, and cognitive slowing. The patient expressed   understanding. Alternatives to this medication were also   discussed with the patient, including risks of not taking   medications, and the patient was " agreeable to the above choice.      I have reviewed the nursing notes. I have reviewed the findings,   diagnosis, plan and need for follow up with the patient.         HPI:     Suicidal ideation due to feeling of hopelessness and helplessness   history of self injury behavior          Pt has not required locked seclusion or restraints in the past   24 hours to maintain safety, please refer to RN documentation for   further details.  Substance use does not appear to be playing a contributing role   in the patient's presentation patient says she use marijuana   daily.  Brief Therapeutic Intervention(s):   Provided active listening, unconditional positive regard, and   validation. Engaged in cognitive restructuring/ reframing, looked   at common cognitive distortions and challenged negative thoughts.   Engaged in guided discovery, explored patient's perspectives and   helped expand them through socratic dialogue. Provided positive   reinforcement for progress towards goals, gains in knowledge, and   application of skills previously taught.  Engaged in social   skills training. Explored and identified early warning signs to   anger        Past Psychiatric History:     Patient never been hospitalized at previously in mental health   unit  Depression unspecified, and generalized anxiety disorder, self   injury behavior and suicidal ideation        Substance Use and History:     Daily marijuana use      Past Medical History:   PAST MEDICAL HISTORY: History reviewed. No pertinent past medical   history.    PAST SURGICAL HISTORY: History reviewed. No pertinent surgical   history.            Allergies:   No Known Allergies          Medications:     I have reviewed this patient's current medications  Current Facility-Administered Medications   Medication     FLUoxetine (PROzac) capsule 10 mg     hydrOXYzine (ATARAX) tablet 25 mg     melatonin tablet 5 mg     OLANZapine zydis (zyPREXA) ODT tab 5 mg     traZODone (DESYREL)  half-tab 25 mg    Or     traZODone (DESYREL) tablet 50 mg     No current outpatient medications on file.              Family History:   FAMILY HISTORY: History reviewed. No pertinent family history.        Social History:   SOCIAL HISTORY:   Social History     Tobacco Use     Smoking status: Every Day     Types: Vaping Device     Smokeless tobacco: Never   Substance Use Topics     Alcohol use: Not Currently            PTA Medications:   (Not in a hospital admission)         Allergies:   No Known Allergies       Labs:     Recent Results (from the past 48 hour(s))   Asymptomatic COVID-19 Virus (Coronavirus) by PCR Nasopharyngeal    Collection Time: 01/18/23  9:51 PM    Specimen: Nasopharyngeal; Swab   Result Value Ref Range    SARS CoV2 PCR Negative Negative   Comprehensive metabolic panel    Collection Time: 01/19/23 12:51 AM   Result Value Ref Range    Sodium 142 133 - 144 mmol/L    Potassium 3.5 3.4 - 5.3 mmol/L    Chloride 113 (H) 96 - 110 mmol/L    Carbon Dioxide (CO2) 24 20 - 32 mmol/L    Anion Gap 5 3 - 14 mmol/L    Urea Nitrogen 6 (L) 7 - 30 mg/dL    Creatinine 0.56 0.50 - 1.00 mg/dL    Calcium 8.5 8.5 - 10.1 mg/dL    Glucose 90 70 - 99 mg/dL    Alkaline Phosphatase 63 40 - 260 U/L    AST 11 0 - 35 U/L    ALT 15 0 - 50 U/L    Protein Total 6.6 (L) 6.8 - 8.8 g/dL    Albumin 3.5 3.4 - 5.0 g/dL    Bilirubin Total 0.3 0.2 - 1.3 mg/dL    GFR Estimate     Acetaminophen level    Collection Time: 01/19/23 12:51 AM   Result Value Ref Range    Acetaminophen <2 (L) 10 - 30 mg/L   Salicylate level    Collection Time: 01/19/23 12:51 AM   Result Value Ref Range    Salicylate <2 <20 mg/dL   CBC with platelets and differential    Collection Time: 01/19/23 12:51 AM   Result Value Ref Range    WBC Count 6.5 4.0 - 11.0 10e3/uL    RBC Count 4.17 3.80 - 5.90 10e6/uL    Hemoglobin 12.6 11.7 - 17.7 g/dL    Hematocrit 36.8 35.0 - 53.0 %    MCV 88 78 - 100 fL    MCH 30.2 26.5 - 33.0 pg    MCHC 34.2 31.5 - 36.5 g/dL    RDW 12.8 10.0 -  15.0 %    Platelet Count 311 150 - 450 10e3/uL    % Neutrophils 47 %    % Lymphocytes 41 %    % Monocytes 8 %    % Eosinophils 3 %    % Basophils 1 %    % Immature Granulocytes 0 %    NRBCs per 100 WBC 0 <1 /100    Absolute Neutrophils 3.1 1.6 - 8.3 10e3/uL    Absolute Lymphocytes 2.7 0.8 - 5.3 10e3/uL    Absolute Monocytes 0.5 0.0 - 1.3 10e3/uL    Absolute Eosinophils 0.2 0.0 - 0.7 10e3/uL    Absolute Basophils 0.1 0.0 - 0.2 10e3/uL    Absolute Immature Granulocytes 0.0 <=0.4 10e3/uL    Absolute NRBCs 0.0 10e3/uL          Physical and Psychiatric Examination:     /64 (BP Location: Left arm, Patient Position: Sitting)     Pulse 84   Temp 97.9  F (36.6  C) (Oral)   Resp 16   Wt 57.5   kg (126 lb 12.8 oz)   SpO2 96%   Weight is 126 lbs 12.8 oz  There is no height or weight on file   to calculate BMI.    Mental Status Exam:  Appearance: awake, alert  Attitude:  cooperative  Eye Contact:  good  Mood:  sad   Affect:  appropriate and in normal range  Speech:  clear, coherent  Language: fluent and intact in English  Psychomotor, Gait, Musculoskeletal:  no evidence of tardive   dyskinesia, dystonia, or tics  Thought Process:  logical and linear  Associations:  no loose associations  Thought Content:  no evidence of suicidal ideation or homicidal   ideation  Insight:  good  Judgement:  intact  Oriented to:  time, person, and place  Attention Span and Concentration:  intact  Recent and Remote Memory:  fair  Fund of Knowledge:  appropriate         Diagnoses:      Depression, unspecified depression type  Anxiety  Suicidal ideation  Self-injurious behavior         Recommendations:   1.  DC per P teams and the ED provider  2.  Patient need outpatient psychiatry to follow-up for   medication they started in the emergency room, fluoxetine 10 mg   and olanzapine 5 mg at bedtime  3.  Outpatient therapy  4.  Consult psychiatry as needed      -  treatment per ED team    - Discussion with  .  - Consulted with P, ED  physician, patient's regarding this   case.    Please call [facility]/DEC at [phone] if you have follow-up   questions or wish to place another consult.  Aliyah Mcdonald, Psychiatric Nurse practitioner    Attestation:  Time with:  Patient: 30  minutes  Treatment Team: 20 Minutes  Chart Review: 30 minutes    Total time spent was 80 minutes. Over 50% of times was spent   counseling and coordination of care.    I, Aliyah Mcdonald, CNP, APRN, Psychiatric Nurse Practitioner have   personally performed an examination of this patient.  I have   edited the note to reflect all relevant changes.  I have   discussed this patient with the care team January 19, 2023.  I   have reviewed all vitals and laboratory findings.    Disclaimer: This note consists of symbols derived from   keyboarding,         Comprehensive metabolic panel     Status: Abnormal    Collection Time: 01/19/23 12:51 AM   Result Value Ref Range    Sodium 142 133 - 144 mmol/L    Potassium 3.5 3.4 - 5.3 mmol/L    Chloride 113 (H) 96 - 110 mmol/L    Carbon Dioxide (CO2) 24 20 - 32 mmol/L    Anion Gap 5 3 - 14 mmol/L    Urea Nitrogen 6 (L) 7 - 30 mg/dL    Creatinine 0.56 0.50 - 1.00 mg/dL    Calcium 8.5 8.5 - 10.1 mg/dL    Glucose 90 70 - 99 mg/dL    Alkaline Phosphatase 63 40 - 260 U/L    AST 11 0 - 35 U/L    ALT 15 0 - 50 U/L    Protein Total 6.6 (L) 6.8 - 8.8 g/dL    Albumin 3.5 3.4 - 5.0 g/dL    Bilirubin Total 0.3 0.2 - 1.3 mg/dL    GFR Estimate      Narrative    The sex of this patient cannot be reliably determined based on discrepancies in demographics (legal sex, sex assigned at birth, gender identity).  Both male and female reference ranges are provided where applicable.  Careful evaluation of the patient s results as compared to the gender specific reference intervals is required in this setting.    CBC with platelets differential     Status: None    Collection Time: 01/19/23 12:51 AM    Narrative    The following orders were created for panel order CBC with  platelets differential.  Procedure                               Abnormality         Status                     ---------                               -----------         ------                     CBC with platelets and d...[[id number]]                      Final result                 Please view results for these tests on the individual orders.   Acetaminophen level     Status: Abnormal    Collection Time: 01/19/23 12:51 AM   Result Value Ref Range    Acetaminophen <2 (L) 10 - 30 mg/L   Salicylate level     Status: Normal    Collection Time: 01/19/23 12:51 AM   Result Value Ref Range    Salicylate <2 <20 mg/dL   CBC with platelets and differential     Status: None    Collection Time: 01/19/23 12:51 AM   Result Value Ref Range    WBC Count 6.5 4.0 - 11.0 10e3/uL    RBC Count 4.17 3.80 - 5.90 10e6/uL    Hemoglobin 12.6 11.7 - 17.7 g/dL    Hematocrit 36.8 35.0 - 53.0 %    MCV 88 78 - 100 fL    MCH 30.2 26.5 - 33.0 pg    MCHC 34.2 31.5 - 36.5 g/dL    RDW 12.8 10.0 - 15.0 %    Platelet Count 311 150 - 450 10e3/uL    % Neutrophils 47 %    % Lymphocytes 41 %    % Monocytes 8 %    % Eosinophils 3 %    % Basophils 1 %    % Immature Granulocytes 0 %    NRBCs per 100 WBC 0 <1 /100    Absolute Neutrophils 3.1 1.6 - 8.3 10e3/uL    Absolute Lymphocytes 2.7 0.8 - 5.3 10e3/uL    Absolute Monocytes 0.5 0.0 - 1.3 10e3/uL    Absolute Eosinophils 0.2 0.0 - 0.7 10e3/uL    Absolute Basophils 0.1 0.0 - 0.2 10e3/uL    Absolute Immature Granulocytes 0.0 <=0.4 10e3/uL    Absolute NRBCs 0.0 10e3/uL    Narrative    The sex of this patient cannot be reliably determined based on discrepancies in demographics (legal sex, sex assigned at birth, gender identity).  Both male and female reference ranges are provided where applicable.  Careful evaluation of the patient s results as compared to the gender specific reference intervals is required in this setting.              MD Esequiel Vu Dung Hoang,  MD  01/19/23 152

## 2023-01-19 NOTE — TELEPHONE ENCOUNTER
R: The pt is currently in the [place] [facility] awaiting placement. Patient is voluntary for metro preference.     Intake morning Bed Search (Done at 11:06 AM)    [facility] is posting 0 beds.   [facility] is posting 0 beds.  [facility] is posting 0 beds. Per call to [facility], facility at capacity.   [facility] is posting 0 beds.  [facility] is posting 0 beds.  [facility] is posting 0 beds.  [facility] is posting 0 beds.  [facility] is posting 0 beds. Low acuity.    [facility] at capacity. The pt remains on the waitlist. Intake continues to identify appropriate bed placement.
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
## 2023-01-19 NOTE — ED NOTES
"Pt presents pleasant, cooperative, but anxious and intermittently irritable. Pt reports \"some\" SI, but doesn't feel that they need to go inpatient and instead they would like to meet with a psychiatrist to get adjusted on medications and outpatient therapy. Denies HI, SIB, and hallucinations. Pt reports being anxious about being \"trapped in a locked area\" and \"being confined\" so they no longer want to be in the hospital. Writer explained that Pt's  team has been notified and are working on the most appropriate plan for ensuring Pt stays safe and gets the care they need.  VSS, med compliant, behaviorally in control.  "

## 2023-01-19 NOTE — ED NOTES
Patient is alert, and oriented, calm and cooperative and asleep in the lounge at this moment. Patient slept restfully for the rest of the night. No signs of distress  or difficulty breathing noted. No behavioral concerns noted at this moment. Urine sample pending collection this morning. Safety precautions in place. 1:1 precautions discontinued. Will continue to monitor.

## 2023-01-19 NOTE — ED NOTES
"Triage & Transition Services, Extended Care     Therapy Progress Note    Patient: Roman goes by \"Roman,\" uses they/them pronouns  Date of Service: January 19, 2023  [facility]  Patient was seen in-person.     Presenting problem:   Roman is followed related to Long wait time for admission: 15+ hours. Please see initial DEC/[facility] Crisis Assessment completed by Virgilio Mackey on 1/18/23 for complete assessment information. Notable concerns include suicidal ideation with plan to overdose or carbon dioxide poisoning. Pt started taking tylenol (4tabs) and aborted attempt PTA.      Individuals Present: Roman & FERNANDO Garcia    Session start: 10:37am  Session end: 11:06am  Session duration in minutes: 29 min  Session number: 1  Anticipated number of sessions or this episode of care:  1  CPT utilized: 20172 - Psychotherapy (with patient) - 30 (16-37*) min    Current Presentation:   Writer met with pt in consult room in the [facility]. Pt reports that they are in agreement with plan for admission. Pt reports feeling a bit better today than when they first arrived. Pt expresses significant stress over not having their best friend around as much anymore because their best friend is in a relationship now. Pt expresses that that person was their only friend. Pt reports having a supportive relationship with their dad, who visited pt in the ED today to bring them a book. Pt expresses that relationship with mother can be a little more challenging because their mom tends to match emotions with bigger emotions. Pt reports feeling frustrated that during their crisis that led them to come to the ED, their mom was stressed about the logistical things such as insurance, instead of about the crisis.     Pt expresses SI thoughts are not as present today, and that they are \"a lot better.\" Pt reports that yesterday, pt realized how many people in their family truly care about them. Pt expresses that their 7 yo brother was scared and that " "made them feel bad. Pt also expresses that their older brother (20 yo) drove home due to pt's crisis. Pt describes having feelings of being overwhelmed, scared, but also comforted by the response from their siblings. Pt reports \"I didn't realize they cared so much.\"    Pt continues to agree with plan for admission, but also would still like to meet with ED psychiatry provider to discuss medications, since pt has not taken medications for MH before. Pt feels that seeing the psychiatry provider in the ED may mitigate the need for IP admission, but is still unsure if they feel ready for discharge at this time.      Mental Status Exam:   Appearance: awake, alert  Attitude: cooperative  Eye Contact: good  Mood: anxious and sad   Affect: mood congruent  Speech: clear, coherent  Psychomotor Behavior: no evidence of tardive dyskinesia, dystonia, or tics  Thought Process:  logical  Associations: no loose associations  Thought Content: passive suicidal ideation present  Insight: good  Judgement: intact  Oriented to: time, person, and place  Attention Span and Concentration: intact  Recent and Remote Memory: intact    Diagnosis:   Unspecified depressive disorder F32.9 - Primary   Unspecified anxiety disorder F41.9     Therapeutic Intervention(s):   Provided active listening, unconditional positive regard, and validation. Engaged in guided discovery, explored patient's perspectives and helped expand them through socratic dialogue.    Treatment Objective(s) Addressed:   The focus of this session was on rapport building, orienting the patient to therapy, processing feelings related to inpatient admission, crisis PTA and identifying treatment goals.     Case Management:   NA     General Recommendations:   Continue to monitor for harm. Consider: Listen in a neutral, non-judgmental way. Offer reassurance    Plan:   Inpatient Mental Health: Pt continues to be in agreement with plan for MH IP admission. Pt reports SI symptoms are " improving, however, still processing emotions related to crisis PTA. Pt would like to consider starting medications as she has not been on psychiatric medication before. Pt also has recent life stressors that have led to feelings of lonliness and isolation. Pt has difficult identifying social supports beyond her friend who she has not had much contact with and family members. Continues to recommend IP MH admission for safety and stabilization. May consider discharge if after psychiatry consultation and safety planning with pt.     Plan for Care reviewed with Assigned Medical Provider? Yes. Provider, Dr Iraheta, response: agreement with plan to reassess since after psych consultation pt feels ready for discharge.      FERNANDO Garcia   Licensed Mental Health Professional (LMHP), [facility]  [phone]

## 2023-01-19 NOTE — PLAN OF CARE
Kelli Mcnally  January 18, 2023  Plan of Care Hand-off Note     Patient Care Path: Inpatient Mental Health    Plan for Care:     Pt is at risk for harm to self. Pt is actively suicidal with plan to overdose on medication or die by carbon monoxide poisoning. Pt has intent to follow through. Earlier in the night, pt planned to take a bottle of tylenol, but only took 4 and then aborted due to being scared. Pt does not have outpatient providers at this time and their mental health has rapidly decompensated since New Years Keesha. Pt will require in patient hospitalization to start medications and manage symptoms in a safe environment. Pt in agreement. Pt is voluntary.      Critical Safety Issues: Pt has history of cutting, most recently 3 days ago.     Overview:  This patient is a child/adolescent: No    This patient has additional special visitor precautions: No    Legal Status: Voluntary    Contacts:   [phone] mom (hosea) - pt did not want  to speak with parents.     Psychiatry Consult:  Adult Psychiatry Consult requested related to starting a medication regimen. Psychiatry IP Consult Order Placed: Yes    Updated RN regarding plan of care.    FERNANDO Holland

## 2023-01-19 NOTE — ED NOTES
"[facility] Crisis Reassessment      Roman Mcnally was reassessed at the request of attending provider for the following reasons: Pt was seen earlier in the day by FERNANDO Reynoso with plan for voluntary IP admission at that time. Following psych consult, pt is now requesting to discharge. Pt was first seen on 01/18/23 by FERNANDO Holland; see the initial assessment note for details.      Patient Presentation    Initial ED presentation details:     Per initial DEC assessment completed on 01/19/23:    Pt presents to ED for concerns of suicidal ideation. Pt took 4 tylenol earlier tonight and planned to take the entire bottle as a suicide attempt. Pt became scared and called their mother, reaching out for help. Currently, pt expresses suicidal ideation with a plan to overdose or die from carbon monoxide poisoning and intent to follow through. Pt endorses visual hallucinations, stating they \"see small things out of the corner of their eye\". Pt engaged in NSSI three days ago via cutting. Pt has history of cutting. Pt does not have providers at this time. History of taking risperidone and prozac, stopped one year ago because they felt better. Pt has previous suicide attempt in 2019 via trying to drown themselves with a weight in their bathtub. Pt reports a fractured relationship with parents because they don't always believe in mental health and are emotionally unavailable. No history of hospitalizations or programmatic care. Pt appears very flat and depressed. Pt believes this crisis started on New Years Keesha after they experienced a \"best friend breakup\".     Current patient presentation: Pt denies suicidal ideation since being in the ED. When asked what contributed to the decline in their suicidal ideation, pt reports being monitored in the hospital overnight and seeing that their family was scared following their suicide attempt. Pt states that they no longer want inpatient admission because being stuck in the ED has made " them feel drained and anxious. They also do not have access to their usual coping skills that they use to distract from their mental health symptoms, including playing video games and listening to music. Pt identifies their parents as individuals that they can talk to about their mental health. They also report that they no longer have access to Tylenol because their parents have already obtained a medication lockbox. Pt denies homicidal ideations, hallucinations, or substance use concerns.    Changes observed since initial assessment: Pt denies suicidal ideation since being in the ED and attributes this decline in suicidal ideation to being monitored in the hospital overnight and seeing that their family was scared following their suicide attempt. Pt identifies their parents as individuals that they can talk to about their mental health. Pt does not have access to medications in the home as their parents have since obtained a medication lockbox.    Risk of Harm    [place] Suicide Severity Rating Scale Since Last Contact: 01/19/23  Suicidal Ideation (Since Last Contact)  1. Wish to be Dead (Since Last Contact): No  2. Non-Specific Active Suicidal Thoughts (Since Last Contact): No  Suicidal Behavior (Since Last Contact)  Actual Attempt (Since Last Contact): No  Has subject engaged in non-suicidal self-injurious behavior? (Since Last Contact): No  Interrupted Attempts (Since Last Contact): No  Aborted or Self-Interrupted Attempt (Since Last Contact): No  Preparatory Acts or Behavior (Since Last Contact): No  Suicide (Since Last Contact): No  Actual/Potential Lethality (Most Lethal Attempt)  Most Lethal Attempt Date: 01/18/23  Actual Lethality/Medical Damage Code (Most Lethal Attempt): No physical damage or very minor physical damage  Potential Lethality Code (Most Lethal Attempt): Behavior likely to result in injury but not likely to cause death  C-SSRS Risk (Since Last Contact)  Calculated C-SSRS Risk Score (Since  Last Contact): No Risk Indicated    Validity of evaluation is not impacted by presenting factors during interview.   Comments regarding subjective versus objective responses to [place] tool: none  Environmental or Psychosocial Events: challenging interpersonal relationships, social isolation  Chronic Risk Factors: history of suicide attempts (x2), LGBTQ+ orientation, history of non-suicidal self injury (NSSI)  Warning Signs: withdrawing from friends, family, and society, anxiety, agitation, unable to sleep, sleeping all the time and recent losses (physical, financial, personal)  Protective Factors: responsibilities and duties to others, including pets and children, lives in a responsibly safe and stable environment and help seeking  Interpretation of Risk Scoring, Risk Mitigation Interventions and Safety Plan:  Pt endorses significant decline in suicidal ideation since arrival to the ED due to being monitored overnight and seeing family's scared responses to pt's suicide attempt. Pt identifies their parents as individuals they can talk to about their mental health, demonstrates insight into their mental health symptoms, and expresses desire to engage in mental health treatment.     Does the patient have thoughts of harming others? No    Mental Status Exam   Affect: Blunted   Appearance: Appropriate    Attention Span/Concentration: Attentive?    Eye Contact: Engaged   Fund of Knowledge: Appropriate    Language /Speech Content: Fluent   Language /Speech Volume: Normal    Language /Speech Rate/Productions: Normal    Recent Memory: Intact   Remote Memory: Intact   Mood: Sad    Orientation to Person: Yes    Orientation to Place: Yes   Orientation to Time of Day: Yes    Orientation to Date: Yes    Situation (Do they understand why they are here?): Yes    Psychomotor Behavior: Normal    Thought Content: Clear   Thought Form: Intact       Additional Collateral Information   This writer spoke to pt's mother, Guerita Mcnally,  at [phone]. Guerita stated that she has obtained a medication lock-box for pt and that the house has been cleaned up to put items away that pt could potentially use to hurt themselves.    Therapeutic Intervention  The following therapeutic methodologies were employed when working with the patient: Establishing rapport, Active listening, Assess dimensions of crisis and Establish a discharge plan. Patient response to intervention: Pt provided detailed answers to this writer about their mental health functioning and engaged with this writer in discharge planning.    Diagnosis:   Unspecified depressive disorder F32.9 - Primary   Unspecified anxiety disorder F41.9     Clinical Substantiation of Recommendations  After therapeutic assessment, intervention and aftercare planning by ED care team and [facility] and in consultation with attending provider, the patient's circumstances and mental state were safe for outpatient management. Pt denies suicidal ideation, has reduced access to means with medication lockbox set-up at home, identifies parents as individuals they can talk to about their mental health, and is help-seeking. The patient was discharged. Close follow-up with a psychiatrist and/or therapist was recommended and community psychiatric resources were provided. Patient is to return to the ED if any urgent or potentially life-threatening concerns arise.       At the time of discharge, the patient's acute suicide risk was determined to be low due to the following factors: reduction in the intensity of mood/anxiety symptoms that preceded the admission, denial of suicidal thoughts, denies feeling helpless or hopeless, not currently under the influence of alcohol or illicit substances and denies experiencing command hallucinations. Protective factors include: social support, voluntarily seeking mental health support, future focused thinking, displays insight, expresses desire to engage in treatment, sense of  obligation to people/pets and safe/stable housing     Plan:    Disposition  Recommended disposition: Individual Therapy and Medication Management      Reviewed case and recommendations with attending provider. Attending Name: Dr. Iraheta      Attending concurs with disposition: Yes      Patient concurs with disposition: Yes      Final disposition: Individual therapy  and Medication management.       Assessment Details  Total duration spent on the patient case in minutes: .75 hrs     CPT code(s) utilized: 86390 - Psychotherapy for Crisis (Each additional 30 minutes) - 30 min        Alis Strong, [facility], [facility]  Callback: [phone]      Aftercare Plan    Follow up with established providers and supports as scheduled. Continue taking medications as prescribed. Abstain from drugs and alcohol. Utilize your [facility] crisis team as needed. They are available 24/7. Contact information is listed below.     The following appointment(s) and/or referral(s) were made on your behalf. If you need to make changes or cancel please contact the clinic/provider directly.     Individual Therapy    Date: Wednesday, 1/25/2023  Time: 11:00 am - 12:00 pm  Provider: Shellie Mullen  Location: [facility], [address] [place] [address]  Phone: [phone]  Type: Therapy - Initial (In-Person)    Patient Instructions  Intakes must be confirmed with client either by email or phone call prior to the appointment. Clients will be sent an email to log into the patient portal to complete intake forms. All intake forms must be completed before the scheduled intake. If the session is scheduled for telehealth, client's will receive an email with a zoom link. Shellie can be reached at [phone] or [facility] Address is [address] [place] [address]. ([facility] on [facility])    Psychiatry    Date: Thursday, 2/9/2023  Time: 8:30 am - 10:00 am  Provider: Liam Rose  MSN   CNP,RN  Location: [facility], [address], [address], [place] [address]  Phone: [phone]  Type: Medication Mgmt - Initial (In-Person)    A referral will also be made for you to [facility]'s [facility]. The Transitions Clinic can see you for medication management until your psychiatry appointment on 02/09/23. You will receive a call from the Transitions [facility] in the next couple of days to schedule a medication management appointment.     Remember: give the referrals 3 sessions prior to calling it quits. Do you trust them? Do you feel understood? Do you think they can help? Check in with yourself after each session    If I am feeling unsafe or I am in a crisis, I will:   Contact my established care providers   Call the National Suicide Prevention Lifeline: 155.662.6312   Go to the nearest emergency room   Call 911     Warning signs that I or other people might notice when a crisis is developing for me: changes to sleep, appetite or mood, increased anger, agitation or irritability, feeling depressed or hopeless, spending more time alone or talking less, increased crying, decreased productivity, seeing or hearing things that aren't there, thoughts of not wanting to live anymore or of actually killing myself, thoughts of hurting others    Things I am able to do on my own to cope or help me feel better: watching a favorite tv show or movie, listening to music I enjoy, going outside and breathing fresh air, going for a walk or exercising, taking a shower or bath, a cold or hot beverage, a healthy snack, drawing/coloring/painting, journaling, singing or dancing, deep breathing     I can try practicing square breathing when I begin to feel anxious - inhale through the nose for the count of 4 and the first line on the square. Exhale through the mouth for the count of 4 for the second line of the square. Repeat to complete the square. Repeat the square as many times as needed.    I can also use  my five senses to practice mindfulness and grounding. What are five things I can see, four things I can hear, three things I can feel, two things I can smell, and one thing I can taste.     Things that I am able to do with others to cope or help me feel better: sometimes just talking or spending time with someone else, sharing a meal or having coffee, watching a movie or playing a game, going for a walk or exercising    I can also use community resources including mental health hotlines, [facility] crisis teams, or apps.     Things I can use or do for distraction: movies/tv, music, reading, games, drawing/coloring/painting or other art, essential oils, exercise, cleaning/organizing, puzzles, crossword puzzles, word search, Sudoku       I can also download a meditation or relaxation riana, like Calm, Headspace, or Insight Timer (all three offer a free version)    Changes I can make to support my mental health and wellness: Attend scheduled mental health therapy and psychiatric appointments. Take my medications as prescribed. Maintain a daily schedule/routine. Abstain from all mood altering substances, including drugs, alcohol, or medications not currently prescribed to me. Implement a self-care routine.      People in my life that I can ask for help: friends or family, trusted teachers/staff/colleagues, trusted members of my community or place of Judaism, mental health crisis lines, or 911    Your [facility] has a mental health crisis team you can call 24/7: [facility] Adult, [phone]    Other things that are important when I m in crisis: to remember that the feelings I am having right now are temporary, and it won't feel like this forever, and that it is okay and important to ask for help    Crisis Lines  Crisis Text Line  Text 183950  You will be connected with a trained live crisis counselor to provide support.    Por espanol, texto  KIANA a 446836 o texto a 442-RUMAME en [facility] [place] Line   "1.800.SUICIDE [5387355]      Community Resources  Fast Tracker  Linking people to mental health and substance use disorder resources  MakersKitn.org     [place] Mental [facility] Warm Line  Peer to peer support  Monday thru Saturday, 12 pm to 10 pm  348.510.7532 or 7.920.357.1378  Text \"Support\" to 57790    National [place] [employer] Mental Illness ([place])  118.939.4957 or 1.888.EL.HELPS      Mental Health Apps  My3  https://Online Prasad.org/    VirtualHopeBox  https://efw-suhl/apps/virtual-hope-box/      Additional Information  Today you were seen by a licensed mental health professional through Triage and Transition services, Behavioral Healthcare Providers ([facility])  for a crisis assessment in the Emergency Department at [facility].  It is recommended that you follow up with your established providers (psychiatrist, mental health therapist, and/or primary care doctor - as relevant) as soon as possible. Coordinators from [facility] will be calling you in the next 24-48 hours to ensure that you have the resources you need.  You can also contact [facility] coordinators directly at [phone]. You may have been scheduled for or offered an appointment with a mental health provider. [facility] maintains an extensive network of licensed behavioral health providers to connect patients with the services they need.  We do not charge providers a fee to participate in our referral network.  We match patients with providers based on a patient's specific needs, insurance coverage, and location.  Our first effort will be to refer you to a provider within your care system, and will utilize providers outside your care system as needed.              "

## 2023-01-19 NOTE — TELEPHONE ENCOUNTER
3:19 PM Per chart review and consult review with Harry, patient will discharge. Intake reached out to [facility] care [facility] for verification of discharge plan. Intake awaiting updated disposition from [facility].     4:31 PM Intake reached out to [facility] to call general intake number at [phone] with update on any discharge, evening shift to follow.

## 2023-01-19 NOTE — ED NOTES
Emergency Department Patient Sign-out       Brief HPI:  This is a 17 year old adult signed out to me by Dr. Spangler .  See initial ED Provider note for details of the presentation.            Significant Events prior to my assuming care: Pt with tylenol ingestion. Needs labs. 4 tabs per history.      Exam:   Patient Vitals for the past 24 hrs:   BP Temp Temp src Pulse Resp SpO2 Weight   01/18/23 2153 -- -- -- -- -- -- 57.5 kg (126 lb 12.8 oz)   01/18/23 2146 104/71 98.9  F (37.2  C) Oral 94 16 97 % --           ED RESULTS:   Results for orders placed or performed during the hospital encounter of 01/18/23 (from the past 24 hour(s))   Asymptomatic COVID-19 Virus (Coronavirus) by PCR Nasopharyngeal     Status: Normal    Collection Time: 01/18/23  9:51 PM    Specimen: Nasopharyngeal; Swab   Result Value Ref Range    SARS CoV2 PCR Negative Negative    Narrative    Testing was performed using the Xpert Xpress SARS-CoV-2 Assay on the Cepheid Gene-Xpert Instrument Systems. Additional information about this Emergency Use Authorization (EUA) assay can be found via the Lab Guide. This test should be ordered for the detection of SARS-CoV-2 in individuals who meet SARS-CoV-2 clinical and/or epidemiological criteria as well as from individuals without symptoms or other reasons to suspect COVID-19. Test performance for asymptomatic patients has only been established in anterior nasal swab specimens. This test is for in vitro diagnostic use under the FDA EUA for laboratories certified under CLIA to perform high complexity testing. This test has not been FDA cleared or approved. A negative result does not rule out the presence of PCR inhibitors in the specimen or target RNA concentration below the limit of detection for the assay. The possibility of a false negative should be considered if the patient's recent exposure or clinical presentation suggests COVID-19. This test was validated by the [facility]. This laboratory is certified under the Clinical Laboratory Improvement Amendments (CLIA) as qualified to perform high complexity laboratory testing.         ED MEDICATIONS:   Medications   melatonin tablet 5 mg (has no administration in time range)   traZODone (DESYREL) half-tab 25 mg ( Oral See Alternative 1/18/23 2340)     Or   traZODone (DESYREL) tablet 50 mg (50 mg Oral Given 1/18/23 2340)   hydrOXYzine (ATARAX) tablet 25 mg (25 mg Oral Given 1/18/23 2340)         Impression:    ICD-10-CM    1. Depression, unspecified depression type  F32.A       2. Anxiety  F41.9       3. Suicidal ideation  R45.851       4. Self-injurious behavior  Z72.89           Plan:    Psych admit. Medically cleared.        MD Rodriguez Mccarthy, Kelvin Barrera MD  01/19/23 3192

## 2023-01-20 ENCOUNTER — TELEPHONE (OUTPATIENT)
Dept: BEHAVIORAL HEALTH | Facility: CLINIC | Age: 18
End: 2023-01-20
Payer: COMMERCIAL

## 2023-01-20 NOTE — TELEPHONE ENCOUNTER
" Mental Health &Addiction (MH&A)Transition Clinic (TC):     Provides Patient Support While Waiting to Access Programmatic and Outpatient MH&A Care and Provides [facility] Crisis Assessment Services     NURSING Referral Review  _________________________________________    This RN has reviewed this Medication Management referral to the Transition Clinic and deemed the referral   [x] Appropriate x  [] Inappropriate   []Consulting     Based on the following criteria:    Pt has a psychiatric provider (or pending plan) in place for future prescribing: Yes:     Pt is scheduled for next LOC:   Provider(s)Liam Rose  MSN  CNP,RN   Location [facility]  [address], [address]   Date/Time 2/9/2023 8:30 am       Timeframe until pt's scheduled psychiatry appointment is less than 6 months: Yes: 20 days     Pt takes psychiatric medications: No     Pt's goals seem to align with this temporary service: Yes: Transition Clinic to bridge psychiatric care and psychiatric medication management until next Level of Care.        Any additional pertinent information regarding this referral: Patient presented to the ED on 1/18/23. Per ED HPI. Kelli Mcnally \"Roman\" is a 17 year old adult (they/them pronouns)who presents with mother for mental health evaluation.  They say they have hx of depression, anxiety, BPD. The patient is having suicidal thoughts with plan and intent.  Earlier today they took 4 tylenol pills.  They were intending to take the whole bottle but stopped because they got \"freaked out\" and called their mom who brought them here.  They are now thinking about turning on the car while in the garage to kill themself.  They have one prior suicide attempt in 2019 where they used a weight while they were in the bathtub.  They feel that parents have tried to deal with their mental health without seeing providers and doing \"meditation.\"  No prior admissions.  They saw a therapist occasionally or school counselor in the " past.  They cut on both arms and last cut on left forearm 3 days ago.  They feel like they can be safe while waiting in the ED but not if they go home. They say the holidays were hard this past year and they have been feeling suicidal since New Years.  They also had a recent best friend break up. They took risperidone and prozac in the past and quit about 1 year ago. They felt it didn't help them.    Initial contact w/ patient/parent: TC Coordinator to contact this patient/patients guardian to schedule a New Person Visit with TC Provider Regina Fitzpatrick.      Additional Scheduling Instructions for Transition Clinic Coordinator:     TC Coordinators:  This is a Medication management only Referral.        Please call the patient at [phone]. Please schedule a NewPerson appointment with TC Provider Regina Fitzpatrick as soon as possible or as indicated by the patient.      TC Coordinator, please educate this (patient/ parent/guardian/facility staff member) as to the purpose and benefit of the TC.      The Transition Clinic is a Temporary Service that helps to bridge the time to your next appointment.  It is not intended to be a long-term service and you are expected to attend your scheduled appointment with your next provider.      Patient/Parent/ Facility Staff Member verbalized understanding     If you need support between appointments, please call [phone] and let them know you're seen by Transition Clinic Psychiatry.  You may also send a [employer] message to reach us.         RN Signature Mirza Waldrop RN on 1/20/2023 at 11:44 AM       FW: TC Referral  Received: Yesterday  [facility] Shanon Mott,   Med management referral. Pt is scheduled for next LOC:   Provider(s)Liam Rose  MSN  CNP,RN   Location [facility]  [address], [address]   Date/Time 2/9/2023 8:30 am   -Evan           Previous Messages     ----- Message -----   From: Maria C Stubbs   Sent:  1/19/2023   4:59 PM CST   To: Transition Clinic, Transition Clinic Rn Agustin   Subject: TC Referral                                       Transition Clinic Referral   [place]         Please Check Type of Referral Requested:       ____THERAPY: The Transition clinic is able to schedule patients without current medical insurance; these patient will be referred to our Social Work Care Coordinator for Medical Insurance              Assistance. We are open for referral for psychotherapy. Patient is referred from:  Extended Care       _X___MEDICATION:  Referrals for Medication are ONLY accepted from the following areas (select): Emergency Department/Urgent Care - HIGH PRIORITY                                       Suboxone and Opioid Management Referrals are automatically denied. TC Psychiatry cannot see patient without active medical insurance.       GUARDIAN: If your patient is not their own Guardian, please provide the following:     Guardian Name:   Guardian Contact Information (Phone & Email) :   Guardian Address:     FOSTER CARE PROVIDER: If your patient lives at a Licensed Foster Care, please provide the following:     Foster Provider:   Foster Provider Contact Information (Phone & Email):   Foster Provider Address:         Referring Provider Contact Name: Alis Strong; Phone Number: [phone]     Reason for Transition Clinic Referral: Bridge until seen by     Next Level of Care Patient Will Be Transitioned To: Med Management   Provider(s)Liam Rose  MSN  CNP,RN   [facility]  [address], [address]   Date/Time 2/9/2023 8:30 am     What Would Be Helpful from the Transition Clinic: N/A      Needs: NO     Does Patient Have Access to Technology: YES     Patient E-mail Address:  [email or web address]     Current Patient Phone Number: [phone]     Clinician Gender Preference (if applicable): NO     Patient location preference: In person     Maria C Stubbs

## 2023-01-20 NOTE — TELEPHONE ENCOUNTER
Writer has fwd medication management referral only to TC RN pool as next level of care set and replied to referral source. Will wait to hear if referral is appropriate or inappropriate.    Aline Machucarera  01/20/2023  1033    ----- Message from Mirza Waldrop RN sent at 1/20/2023 10:10 AM CST -----  Regarding: RE: TC Referral  Duplicate Referral .    ----- Message -----  From: Maria C Stubbs  Sent: 1/19/2023   4:59 PM CST  To: Transition Clinic, Transition Clinic Rn Pool  Subject: TC Referral                                      Transition Clinic Referral   [place]         Please Check Type of Referral Requested:       ____THERAPY: The Transition clinic is able to schedule patients without current medical insurance; these patient will be referred to our Social Work Care Coordinator for Medical Insurance              Assistance. We are open for referral for psychotherapy. Patient is referred from:  Extended Care      _X___MEDICATION:  Referrals for Medication are ONLY accepted from the following areas (select): Emergency Department/Urgent Care - HIGH PRIORITY                                       Suboxone and Opioid Management Referrals are automatically denied. TC Psychiatry cannot see patient without active medical insurance.       GUARDIAN: If your patient is not their own Guardian, please provide the following:    Guardian Name:  Guardian Contact Information (Phone & Email) :  Guardian Address:     FOSTER CARE PROVIDER: If your patient lives at a Licensed Foster Care, please provide the following:    Foster Provider:  Foster Provider Contact Information (Phone & Email):  Foster Provider Address:         Referring Provider Contact Name: Alis Strong; Phone Number: [phone]    Reason for Transition Clinic Referral: Bridge until seen by     Next Level of Care Patient Will Be Transitioned To: Med Management  Provider(s)Liam Rose  MSN  CNP,RN  [facility] [address]  [place], [address]  Date/Time 2/9/2023 8:30 am    What Would Be Helpful from the Transition Clinic: N/A     Needs: NO    Does Patient Have Access to Technology: YES    Patient E-mail Address:  [email or web address]    Current Patient Phone Number: [phone]    Clinician Gender Preference (if applicable): NO    Patient location preference: In person    Maria C Stubbs

## 2023-01-26 ENCOUNTER — TELEPHONE (OUTPATIENT)
Dept: BEHAVIORAL HEALTH | Facility: CLINIC | Age: 18
End: 2023-01-26
Payer: COMMERCIAL

## 2023-01-26 NOTE — TELEPHONE ENCOUNTER
This LPN attempted to call pt x4 for check-in. No answer; LVM requesting pt call TC to reschedule.